# Patient Record
Sex: MALE | Race: WHITE | Employment: STUDENT | URBAN - METROPOLITAN AREA
[De-identification: names, ages, dates, MRNs, and addresses within clinical notes are randomized per-mention and may not be internally consistent; named-entity substitution may affect disease eponyms.]

---

## 2017-05-11 ENCOUNTER — APPOINTMENT (OUTPATIENT)
Dept: CT IMAGING | Facility: HOSPITAL | Age: 22
End: 2017-05-11
Attending: EMERGENCY MEDICINE
Payer: COMMERCIAL

## 2017-05-11 ENCOUNTER — HOSPITAL ENCOUNTER (EMERGENCY)
Facility: HOSPITAL | Age: 22
Discharge: HOME | End: 2017-05-11
Attending: EMERGENCY MEDICINE
Payer: COMMERCIAL

## 2017-05-11 VITALS
HEART RATE: 93 BPM | HEIGHT: 72 IN | SYSTOLIC BLOOD PRESSURE: 113 MMHG | WEIGHT: 137 LBS | BODY MASS INDEX: 18.56 KG/M2 | OXYGEN SATURATION: 100 % | DIASTOLIC BLOOD PRESSURE: 67 MMHG | TEMPERATURE: 98 F | RESPIRATION RATE: 18 BRPM

## 2017-05-11 DIAGNOSIS — G93.0 SUBARACHNOID CYST: ICD-10-CM

## 2017-05-11 DIAGNOSIS — S01.81XA FACIAL LACERATION, INITIAL ENCOUNTER: ICD-10-CM

## 2017-05-11 DIAGNOSIS — R55 SYNCOPE AND COLLAPSE: Primary | ICD-10-CM

## 2017-05-11 PROCEDURE — 93010 ELECTROCARDIOGRAM REPORT: CPT

## 2017-05-11 PROCEDURE — 12013 RPR F/E/E/N/L/M 2.6-5.0 CM: CPT

## 2017-05-11 PROCEDURE — 85025 COMPLETE CBC W/AUTO DIFF WBC: CPT | Performed by: EMERGENCY MEDICINE

## 2017-05-11 PROCEDURE — 96361 HYDRATE IV INFUSION ADD-ON: CPT

## 2017-05-11 PROCEDURE — 80053 COMPREHEN METABOLIC PANEL: CPT | Performed by: EMERGENCY MEDICINE

## 2017-05-11 PROCEDURE — 70450 CT HEAD/BRAIN W/O DYE: CPT | Performed by: EMERGENCY MEDICINE

## 2017-05-11 PROCEDURE — 99285 EMERGENCY DEPT VISIT HI MDM: CPT

## 2017-05-11 PROCEDURE — 96374 THER/PROPH/DIAG INJ IV PUSH: CPT

## 2017-05-11 PROCEDURE — 93005 ELECTROCARDIOGRAM TRACING: CPT

## 2017-05-11 RX ORDER — ONDANSETRON 2 MG/ML
4 INJECTION INTRAMUSCULAR; INTRAVENOUS ONCE
Status: COMPLETED | OUTPATIENT
Start: 2017-05-11 | End: 2017-05-11

## 2017-05-11 RX ORDER — LORATADINE 10 MG/1
10 TABLET ORAL DAILY PRN
COMMUNITY

## 2017-05-11 RX ORDER — FLUOXETINE HYDROCHLORIDE 20 MG/1
20 CAPSULE ORAL DAILY
COMMUNITY
End: 2017-07-07 | Stop reason: ALTCHOICE

## 2017-05-11 NOTE — ED INITIAL ASSESSMENT (HPI)
Pt reports waking up at 0230 and was nauseous. Pt had a syncopal episode and pt was unconscious for 2-3 minutes. Pt vomited afterwards. Pt denies nausea at this time. Pt has a 1 inch laceration to the left upper eye.

## 2017-05-11 NOTE — ED PROVIDER NOTES
Patient Seen in: [facility] Emergency Department    History   Patient presents with:  Fall (musculoskeletal, neurologic)    Stated Complaint: syncope    HPI    The patient is a 21-year-old previously healthy male presenting to the emergency departme Exam    General: Alert and oriented in no distress. Neuro: CN II-XII intact.  Grossly normal and symmetric motor strength and sensation proximally and distally throughout all 4 extremity  HEENT: There is a 4 cm linear laceration to his left eyebrow, withou CBC W/ DIFFERENTIAL[[id number]]          Abnormal            Final result                 Please view results for these tests on the individual orders.    RAINBOW DRAW BLUE   RAINBOW DRAW GOLD   RAINBOW DRAW LAVENDER   RAINBOW DRAW LIGHT G any indication for imaging of his abdomen. I did reexamine him, and he states that he feels improved, he feels well. He has no abdominal pain. And his abdomen is nontender.    I also discussed with him and his family concussion precautions as well as th

## 2017-05-15 ENCOUNTER — HOSPITAL ENCOUNTER (EMERGENCY)
Facility: HOSPITAL | Age: 22
Discharge: HOME | End: 2017-05-15
Attending: EMERGENCY MEDICINE
Payer: COMMERCIAL

## 2017-05-15 VITALS
SYSTOLIC BLOOD PRESSURE: 116 MMHG | HEIGHT: 72 IN | BODY MASS INDEX: 18.69 KG/M2 | RESPIRATION RATE: 16 BRPM | WEIGHT: 138 LBS | TEMPERATURE: 98 F | HEART RATE: 69 BPM | OXYGEN SATURATION: 99 % | DIASTOLIC BLOOD PRESSURE: 81 MMHG

## 2017-05-15 DIAGNOSIS — Z48.02 ENCOUNTER FOR REMOVAL OF SUTURES: Primary | ICD-10-CM

## 2017-05-15 NOTE — ED PROVIDER NOTES
Patient Seen in: [facility] Emergency Department    History   Patient presents with:  Randy Arts (ingtegumentary)    Stated Complaint: suture removal    HPI    14-year-old male presents the emergency department for suture removal.  Patient complications. Patient will be discharged home.   MDM           Disposition and Plan     Clinical Impression:  Encounter for removal of sutures  (primary encounter diagnosis)    Disposition:  Discharge    Follow-up:  [facility] Emergency Department  8

## 2017-07-07 ENCOUNTER — TELEPHONE (OUTPATIENT)
Dept: FAMILY MEDICINE CLINIC | Facility: CLINIC | Age: 22
End: 2017-07-07

## 2017-07-07 RX ORDER — ESCITALOPRAM OXALATE 10 MG/1
TABLET ORAL
Qty: 30 TABLET | Refills: 1 | Status: SHIPPED | OUTPATIENT
Start: 2017-07-07 | End: 2017-09-07

## 2017-07-07 NOTE — TELEPHONE ENCOUNTER
Has issues with social anxiety at school @[employer] for the summer. Was seeing psychiatrist stopped seeing her. Wants to go on Lexapro for BUL and social anxiety. Start 5 mg Lexapro and after 1 week increase to 10 mg. No suicidal or homicidal thoughts.  Has

## 2017-07-26 ENCOUNTER — CHARTING TRANS (OUTPATIENT)
Dept: OTHER | Age: 22
End: 2017-07-26

## 2017-08-01 ENCOUNTER — DIAGNOSTIC TRANS (OUTPATIENT)
Dept: OTHER | Age: 22
End: 2017-08-01

## 2017-08-15 ENCOUNTER — CHARTING TRANS (OUTPATIENT)
Dept: OTHER | Age: 22
End: 2017-08-15

## 2017-08-22 ENCOUNTER — CHARTING TRANS (OUTPATIENT)
Dept: OTHER | Age: 22
End: 2017-08-22

## 2017-08-23 ENCOUNTER — CHARTING TRANS (OUTPATIENT)
Dept: OTHER | Age: 22
End: 2017-08-23

## 2017-09-07 ENCOUNTER — TELEPHONE (OUTPATIENT)
Dept: FAMILY MEDICINE CLINIC | Facility: CLINIC | Age: 22
End: 2017-09-07

## 2017-09-07 RX ORDER — ESCITALOPRAM OXALATE 10 MG/1
TABLET ORAL
Qty: 30 TABLET | Refills: 0 | Status: SHIPPED | OUTPATIENT
Start: 2017-09-07 | End: 2017-09-19

## 2017-09-13 ENCOUNTER — CHARTING TRANS (OUTPATIENT)
Dept: OTHER | Age: 22
End: 2017-09-13

## 2017-09-15 ENCOUNTER — CHARTING TRANS (OUTPATIENT)
Dept: OTHER | Age: 22
End: 2017-09-15

## 2017-09-19 RX ORDER — ESCITALOPRAM OXALATE 10 MG/1
TABLET ORAL
Qty: 30 TABLET | Refills: 5 | Status: SHIPPED | OUTPATIENT
Start: 2017-09-19 | End: 2018-06-08

## 2017-09-19 NOTE — TELEPHONE ENCOUNTER
Away at college telephone call doing great with social anxiety on the Lexapro wishes to continue. Had testing with neurology for fainting spell all negative including EEG, Holter monitor and MRI. No suicidal or homicidal thoughts.

## 2018-05-09 ENCOUNTER — TELEPHONE (OUTPATIENT)
Dept: FAMILY MEDICINE CLINIC | Facility: CLINIC | Age: 23
End: 2018-05-09

## 2018-05-09 RX ORDER — BUSPIRONE HYDROCHLORIDE 10 MG/1
TABLET ORAL
Qty: 60 TABLET | Refills: 3 | Status: SHIPPED | OUTPATIENT
Start: 2018-05-09 | End: 2020-05-14

## 2018-05-09 NOTE — TELEPHONE ENCOUNTER
Anxiety due to recent breakup is off the Lexapro for past several months since he was feeling better. Looking for help with anxiety but wants to just be on anxiety medicine not depression medicine since he has no symptoms of depression.   States his motiv

## 2018-06-08 ENCOUNTER — TELEPHONE (OUTPATIENT)
Dept: FAMILY MEDICINE CLINIC | Facility: CLINIC | Age: 23
End: 2018-06-08

## 2018-06-08 NOTE — TELEPHONE ENCOUNTER
Patient called concerned about his mood getting more depressed secondary to recent breakup with girlfriend. He wants to go back on medication was on Lexapro in past and did get fatigue wants to try a different SSRI. No suicidal or homicidal thoughts.  Stat

## 2018-08-26 ENCOUNTER — TELEPHONE (OUTPATIENT)
Dept: FAMILY MEDICINE CLINIC | Facility: CLINIC | Age: 23
End: 2018-08-26

## 2018-08-26 NOTE — TELEPHONE ENCOUNTER
Needs refill Sertraline working well still away at college and anxiety is much better. No side effects.

## 2018-11-02 VITALS
HEIGHT: 72 IN | BODY MASS INDEX: 18.69 KG/M2 | WEIGHT: 138 LBS | DIASTOLIC BLOOD PRESSURE: 62 MMHG | SYSTOLIC BLOOD PRESSURE: 105 MMHG

## 2018-11-03 VITALS
BODY MASS INDEX: 18.39 KG/M2 | HEART RATE: 82 BPM | DIASTOLIC BLOOD PRESSURE: 66 MMHG | HEIGHT: 72 IN | WEIGHT: 135.78 LBS | SYSTOLIC BLOOD PRESSURE: 104 MMHG

## 2018-11-03 VITALS
BODY MASS INDEX: 18.28 KG/M2 | DIASTOLIC BLOOD PRESSURE: 68 MMHG | HEIGHT: 72 IN | SYSTOLIC BLOOD PRESSURE: 106 MMHG | WEIGHT: 135 LBS

## 2020-05-14 PROBLEM — R11.0 NAUSEA: Status: ACTIVE | Noted: 2020-05-14

## 2020-05-14 PROBLEM — F41.1 GAD (GENERALIZED ANXIETY DISORDER): Status: ACTIVE | Noted: 2020-05-14

## 2020-05-14 PROBLEM — K58.0 IRRITABLE BOWEL SYNDROME WITH DIARRHEA: Status: ACTIVE | Noted: 2020-05-14

## 2020-05-14 NOTE — PROGRESS NOTES
Virtual Telephone Check-In    Wu Londono verbally consents to a Virtual/Telephone Check-In visit on 05/14/20. Patient understands and accepts financial responsibility for any deductible, co-insurance and/or co-pays associated with this service. normal.  He is having harder time staying asleep excessive worry. No suicidal homicidal ideations. Past medical history is positive for anxiety and treatment with Lexapro and then Zoloft Zoloft working better for him.   Also had been on BuSpar in the past History:  Social History    Tobacco Use      Smoking status: Never Smoker      Smokeless tobacco: Never Used    Alcohol use:  Yes      Alcohol/week: 3.0 standard drinks      Types: 3 Cans of beer per week      Frequency: 2-4 times a month      Drinks per se benefits and side effects. Discussed if any problems call office immediately especially if gets increased depression, anxiety or any homicidal or suicidal symptoms. Consider  counseling if symptoms persist follow-up in 3 to 4 weeks virtual visit.   Stre

## 2020-05-15 NOTE — PATIENT INSTRUCTIONS
Treating Anxiety Disorders with Medicine  An anxiety disorder can make you feel nervous or apprehensive, even without a clear reason.  In people age 72 and older, generalized anxiety disorder is one of the most commonly diagnosed anxiety disorders. Many t Never use alcohol or other drugs with anti-anxiety medicines. This could result in loss of muscular control, sedation, coma, or death. Also, use only the amount of medicine prescribed for you.  If you think you may have taken too much, get emergency care ri · Addiction. If David Gonzalez never had a problem with drugs or alcohol, you may not have a problem with medicines used to treat anxiety disorders. But always discuss the medicines with your healthcare provider before taking them.  If you have a history of addicti · Trouble concentrating  · Racing heartbeat  · Fast breathing  · Shaking or trembling  · Stomachache  · Diarrhea  · Loss of energy  · Sweating  · Cold, clammy hands  · Chest pain  · Dry mouth  Anxiety can also be a problem  Anxiety can become a problem whe · Exercise—it’s a great way to relieve tension and help your body feel relaxed. · Avoid caffeine and nicotine, which can make anxiety symptoms worse. · Fight the temptation to turn to alcohol or unprescribed drugs for relief.  They only make things worse

## 2020-07-17 ENCOUNTER — TELEPHONE (OUTPATIENT)
Dept: FAMILY MEDICINE CLINIC | Facility: CLINIC | Age: 25
End: 2020-07-17

## 2020-07-17 NOTE — TELEPHONE ENCOUNTER
The patient's completed \"AMERICANS WITH DISABILITIES ACT (ADA) [address] form was successfully faxed to Danae W Laurent Diaz at [phone].

## 2020-08-06 ENCOUNTER — TELEPHONE (OUTPATIENT)
Dept: FAMILY MEDICINE CLINIC | Facility: CLINIC | Age: 25
End: 2020-08-06

## 2020-08-06 DIAGNOSIS — F41.1 GAD (GENERALIZED ANXIETY DISORDER): ICD-10-CM

## 2020-08-06 NOTE — TELEPHONE ENCOUNTER
Patient called stating he is doing well on the Zoloft feels the anxiety is under good control has had a change in his job hours which is also helping with his anxiety requests a refill for the sertraline tolerates well no side effects.   States that his moo

## 2021-02-13 PROBLEM — F32.A MILD DEPRESSIVE DISORDER: Status: ACTIVE | Noted: 2021-02-13

## 2021-02-13 PROBLEM — R11.0 NAUSEA: Status: RESOLVED | Noted: 2020-05-14 | Resolved: 2021-02-13

## 2021-02-13 PROBLEM — Z79.899 MEDICATION MANAGEMENT: Status: ACTIVE | Noted: 2021-02-13

## 2021-02-13 PROBLEM — F33.8 SEASONAL AFFECTIVE DISORDER (HCC): Status: ACTIVE | Noted: 2021-02-13

## 2021-02-13 NOTE — PROGRESS NOTES
Manuel Steinberg is a 23year old male. HPI:   Please note that the following visit was completed using two-way, real-time interactive audio and video communication.   This has been done in good ainsley to provide continuity of care in the best interest o Occasionally will get episodes when he feels motivated and happy for 2 to 3 days then gets down and depressed again. Denies any manic symptoms does not do any risk-taking. Has a roommate who he gets along with.   Does state that his roommate and him were days  2. Feeling down, depressed, or hopeless: Nearly every day  3. Trouble falling or staying asleep, or sleeping too much: Nearly every day  4. Feeling tired or having little energy: Several days  5. Poor appetite or overeating: Not at all  6.  Feeling ba monthly    Drug use: Never       REVIEW OF SYSTEMS:   GENERAL HEALTH: feels well otherwise  SKIN: denies any unusual skin lesions or rashes  RESPIRATORY: denies shortness of breath with exertion  CARDIOVASCULAR: denies chest pain on exertion  GI: denies ab seasonal affective disorder and COVID-19 pandemic situation patient will start bupropion 150 mg 1 in the morning. Reviewed the risk of increased anxiety and risk of seizures.   Patient has no history of seizures and is aware of the side effects to watch ou

## 2021-06-25 NOTE — PATIENT INSTRUCTIONS
Supplement suggestions for energy/anxiety/insomnia. Methyl folate 400 mcg and methylcobalamin 1000 mcg. Vitamin D3 @ 5000 international units, magnesium glycinate 400 mg and ashwagandha.

## 2021-06-25 NOTE — PROGRESS NOTES
Emerald Ha is a 27year old male who presents for a complete physical exam.   HPI:   Patient is here for a complete physical and needs paperwork filled out for his job.   Last year patient was battling with generalized anxiety disorder, depression symptoms PGF prostate cancer   No STI risk  ED symptoms no issues   No sexual activity in the past did use condoms   Immunizations needed possible TD is going to check with his mom to see if it is due if it is he will get it done in [facility] [address] Feeling tired or having little energy: More than half the days  5. Poor appetite or overeating: Not at all  6. Feeling bad about yourself - or that you are a failure or have let yourself or your family down: Not at all  7.  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  : No. Child palpation  LUNGS: CTA A/P, no wheezes/ronchi/rales/crackles, normal air excursion  CARDIOVASCULAR: RRR, no murmur, no lower extremity edema, pedal and femoral pulses 2+ and symmetric b/l  GI: normoactive BS, non-distended, non-tender to palpation, no HSM/m fresh fruits and lean meats. Aerobic exercise 30 minutes five days a week for cardiovascular fitness and 45-60 minutes 6-7 days a week for weight loss. Advised testicular self exam once a month. Dental and  vision exams  2.  BLU (generalized anxiety disor

## 2021-06-27 PROBLEM — F32.A MILD DEPRESSIVE DISORDER: Status: RESOLVED | Noted: 2021-02-13 | Resolved: 2021-06-27

## 2021-06-27 PROBLEM — Z79.899 MEDICATION MANAGEMENT: Status: RESOLVED | Noted: 2021-02-13 | Resolved: 2021-06-27

## 2021-06-28 ENCOUNTER — TELEPHONE (OUTPATIENT)
Dept: FAMILY MEDICINE CLINIC | Facility: CLINIC | Age: 26
End: 2021-06-28

## 2021-07-01 NOTE — TELEPHONE ENCOUNTER
On 06/28/2021, per Shirley Whitehead PA-C, the patient's completed \"American's With Disabilities (ADA) [facility] [facility] Provider Questionnaire\" was successfully faxed to [address] [place] [address] at [phone].

## (undated) NOTE — ED AVS SNAPSHOT
[facility] Emergency Department    [place]  [address] [address]    Phone:  [phone]    Fax:  [phone]    MRN: [id number]    Department:  [facility] Emergency Department   Date of Visit: IF THERE IS ANY CHANGE OR WORSENING OF YOUR CONDITION, CALL YOUR PRIMARY CARE PHYSICIAN AT ONCE OR RETURN IMMEDIATELY TO THE EMERGENCY DEPARTMENT.     If you have been prescribed any medication(s), please fill your prescription right away and begin taking t

## (undated) NOTE — ED AVS SNAPSHOT
[facility] Emergency Department    [facility]  [address] [address]    Phone:  [phone]    Fax:  [phone]    MRN: [id number]    Department:  [facility] Emergency Department   Date of Visit: from our patient liason soon after your visit. Also, some patients receive a detailed feedback survey mailed to them a week after the visit. If you receive this, we would really appreciate it if you could take the time to complete it. Thank you!       You [facility] [address] ([address] [facility] [id number] [address] ([address]) [facility] [address] 176 [facility]. ([address]) [facility] Support Staff. Remember, MyChart is NOT to be used for urgent needs. For medical emergencies, dial 911.

## (undated) NOTE — ED AVS SNAPSHOT
[facility] Emergency Department    [facility]  [address] [address]    Phone:  [phone]    Fax:  [phone]    MRN: [id number]    Department:  [facility] Emergency Department   Date of Visit: IF THERE IS ANY CHANGE OR WORSENING OF YOUR CONDITION, CALL YOUR PRIMARY CARE PHYSICIAN AT ONCE OR RETURN IMMEDIATELY TO THE EMERGENCY DEPARTMENT.     If you have been prescribed any medication(s), please fill your prescription right away and begin taking t

## (undated) NOTE — ED AVS SNAPSHOT
[facility] Emergency Department    [place]  [address] [address]    Phone:  [phone]    Fax:  [phone]    MRN: [id number]    Department:  [facility] Emergency Department   Date of Visit: coverage for follow-up care and referrals. [address] [facility] [place] [phone]  Pediatric [phone] Emergency Department   [phone]       To Check ER Wait Times:  TEXT 'ERwait' to 62830      Click [email or web address] will be contacted. Please make sure we have your correct phone number before you leave. After you leave, you should follow the attached instructions. I have read and understand the instructions given to me by my caregivers.         24[facility] CT BRAIN OR HEAD ([id number]) (In process)       [employer]     Sign up for [facility], your secure online medical record. [employer] will allow you to access patient instructions from your recent visit,  view other health information, and more.  To sign up or find mor